# Patient Record
Sex: FEMALE | Race: WHITE
[De-identification: names, ages, dates, MRNs, and addresses within clinical notes are randomized per-mention and may not be internally consistent; named-entity substitution may affect disease eponyms.]

---

## 2019-01-09 ENCOUNTER — HOSPITAL ENCOUNTER (EMERGENCY)
Dept: HOSPITAL 11 - JP.ED | Age: 39
Discharge: HOME | End: 2019-01-09
Payer: MEDICAID

## 2019-01-09 VITALS — SYSTOLIC BLOOD PRESSURE: 112 MMHG | DIASTOLIC BLOOD PRESSURE: 72 MMHG

## 2019-01-09 DIAGNOSIS — N18.9: ICD-10-CM

## 2019-01-09 DIAGNOSIS — F17.210: ICD-10-CM

## 2019-01-09 DIAGNOSIS — T36.0X5A: ICD-10-CM

## 2019-01-09 DIAGNOSIS — Z98.51: ICD-10-CM

## 2019-01-09 DIAGNOSIS — L27.0: Primary | ICD-10-CM

## 2019-01-09 DIAGNOSIS — Z91.030: ICD-10-CM

## 2019-01-09 DIAGNOSIS — Z90.49: ICD-10-CM

## 2019-01-09 PROCEDURE — 99283 EMERGENCY DEPT VISIT LOW MDM: CPT

## 2019-01-09 PROCEDURE — 96372 THER/PROPH/DIAG INJ SC/IM: CPT

## 2019-01-09 NOTE — EDM.PDOC
ED HPI GENERAL MEDICAL PROBLEM





- General


Chief Complaint: Allergic Reaction


Stated Complaint: ALLERGIC REACTION TO MEDS


Time Seen by Provider: 19 12:20


Source of Information: Reports: Patient


History Limitations: Reports: No Limitations





- History of Present Illness


INITIAL COMMENTS - FREE TEXT/NARRATIVE: 





37 yo female here with diffuse skin redness and itching. Took a first dose of 

prescribed amoxicillin today and this reaction began shortly afterwards. Has 

had amoxicillin many times in the past, always without issue. Denies any 

difficulty breathing or swallowing and is not light-headed. 


Onset: Today


Onset Date: 19


Onset Time: 11:00


Duration: Minutes:, Constant


Location: Reports: Generalized


Quality: Reports: Other (no pain)


Severity: Moderate


Improves with: Reports: None


Worsens with: Reports: Other (? amoxicillin)


Context: Reports: Other (See HPI)


Associated Symptoms: Reports: Rash (diffuse).  Denies: Chest Pain, Fever/Chills

, Nausea/Vomiting, Shortness of Breath, Syncope


Treatments PTA: Reports: Other (see below) (none)





- Related Data


 Allergies











Allergy/AdvReac Type Severity Reaction Status Date / Time


 


amoxicillin Allergy  Rash Verified 19 12:22


 


venom-honey bee Allergy  Swelling Verified 16 01:02





[bee venom (honey bee)]     











Home Meds: 


 Home Meds





EPINEPHrine [Epipen 2-Paul] 0.3 mg IM ASDIRECTED 10/22/13 [History]


Clindamycin HCl [Cleocin HCl] 300 mg PO TID #21 capsule 19 [Rx]











Past Medical History


HEENT History: Reports: Impaired Vision


Other HEENT History: Dental caries


Respiratory History: Reports: Bronchitis, Recurrent


Gastrointestinal History: Reports: GERD


Genitourinary History: Reports: Chronic Renal Insuffiency, Renal Disease


OB/GYN History: Reports: Pregnancy


Musculoskeletal History: Reports: Arthritis, Back Pain, Chronic, Fibromyalgia


Neurological History: Reports: Concussion, Head Trauma, Migraines, Seizure


Other Neuro History: seizure activity not recent


Psychiatric History: Reports: Anxiety





- Infectious Disease History


Infectious Disease History: Reports: Chicken Pox





- Past Surgical History


HEENT Surgical History: Reports: Oral Surgery


GI Surgical History: Reports: Appendectomy, Cholecystectomy


Female  Surgical History: Reports:  Section, Tubal Ligation





Social & Family History





- Family History


Family Medical History: Noncontributory





- Tobacco Use


Smoking Status *Q: Heavy Tobacco Smoker


Years of Tobacco use: 20


Packs/Tins Daily: 1





- Recreational Drug Use


Recreational Drug Use: No





ED ROS ALLERGIC REACTION





- Review of Systems


Review Of Systems: See Below


Constitutional: Reports: No Symptoms


HEENT: Reports: No Symptoms


Respiratory: Reports: No Symptoms


Cardiovascular: Reports: No Symptoms


Endocrine: Reports: No Symptoms


GI/Abdominal: Reports: No Symptoms


: Reports: No Symptoms


Musculoskeletal: Reports: No Symptoms


Skin: Reports: Pruritis, Rash, Erythema


Neurological: Reports: No Symptoms





ED EXAM GENERAL NO PERIP PULSE





- Physical Exam


Exam: See Below


Exam Limited By: No Limitations


General Appearance: Alert, WD/WN, No Apparent Distress


Eye Exam: Bilateral Eye: Normal Inspection


Ears: Normal External Exam, Normal Canal, Hearing Grossly Normal


Nose: Normal Inspection, No Blood


Throat/Mouth: Normal Inspection, Normal Lips, Normal Oropharynx, Normal Voice, 

No Airway Compromise


Head: Atraumatic, Normocephalic


Neck: Normal Inspection


Respiratory/Chest: No Respiratory Distress, Lungs Clear, Normal Breath Sounds, 

No Accessory Muscle Use


Cardiovascular: Regular Rate, Rhythm, No Edema.  No: Tachycardia


GI/Abdominal: Normal Bowel Sounds, Soft, Non-Tender, No Distention


Back Exam: Normal Inspection.  No: CVA Tenderness (R), CVA Tenderness (L)


Extremities: Normal Inspection, Normal Range of Motion, Non-Tender, No Pedal 

Edema


Neurological: Alert, Oriented, CN II-XII Intact, Normal Cognition, No Motor/

Sensory Deficits


Psychiatric: Normal Affect, Normal Mood


Skin Exam: Warm, Dry, Intact, Erythema, Rash (fine, diffuse).  No: Increased 

Warmth, Petechiae





Course





- Vital Signs


Last Recorded V/S: 


 Last Vital Signs











Temp  36.6 C   19 12:20


 


Pulse  113 H  19 12:20


 


Resp  17   19 12:20


 


BP  110/78   19 12:20


 


Pulse Ox  94 L  19 12:20














- Orders/Labs/Meds


Meds: 


Medications














Discontinued Medications














Generic Name Dose Route Start Last Admin





  Trade Name Lexi  PRN Reason Stop Dose Admin


 


Hydroxyzine HCl  75 mg  19 12:26  19 12:31





  Vistaril  IM  19 12:27  75 mg





  ONETIME ONE   Administration





     





     





     





     














Departure





- Departure


Time of Disposition: 13:19


Disposition: Home, Self-Care 01


Condition: Good


Clinical Impression: 


Allergic reaction to drug


Qualifiers:


 Encounter type: initial encounter Qualified Code(s): T78.40XA - Allergy, 

unspecified, initial encounter








- Discharge Information


*PRESCRIPTION DRUG MONITORING PROGRAM REVIEWED*: No


*COPY OF PRESCRIPTION DRUG MONITORING REPORT IN PATIENT ARACELI: No


Prescriptions: 


Clindamycin HCl [Cleocin HCl] 300 mg PO TID #21 capsule


Instructions:  Allergies, Adult, Easy-to-Read


Referrals: 


PCP,None [Primary Care Provider] - 


Forms:  ED Department Discharge


Additional Instructions: 


Stop amoxicillin and don't take any medicine from the 'cillin family in the 

future. Take clindamycin as directed. F/U with your dentist as previously 

scheduled. Starting after 5 pm today if you have any residual itching take 

diphenhydramine 50 mg every 4-6 hrs as needed. Return if a lot worse.

## 2021-03-05 NOTE — CR
Tibia Fibula Rt

 

CLINICAL HISTORY: Pain below right knee, fall

 

FINDINGS: Two views show no evidence of fracture or bone destruction. No soft

tissue abnormality is seen.

 

Impression: Negative
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
## 2021-07-16 NOTE — EDM.PDOC
ED HPI GENERAL MEDICAL PROBLEM





- General


Chief Complaint: Allergic Reaction


Stated Complaint: PRESCRIBED SAVELLA BY DIONISIO XENIA EXPER SIDE EFFECT


Time Seen by Provider: 21 11:40


Source of Information: Reports: Patient


History Limitations: Reports: No Limitations





- History of Present Illness


INITIAL COMMENTS - FREE TEXT/NARRATIVE: 


This is a 40 year old female presenting with dizziness, shakiness, nausea, and 

sweating. She reports that around 9 am this morning she took her first dose of 

Savella for her fibromyalgia. Shortly after, she started experiencing dizziness,

feeling shaking, nausea and dry heaving, and sweating. She was otherwise in her 

usual state of health prior to taking this medication for the first time. She 

denies any chest pain or SOB. She denies abdominal pain, diarrhea, or urinary 

symptoms.





- Related Data


                                    Allergies











Allergy/AdvReac Type Severity Reaction Status Date / Time


 


amoxicillin Allergy  Rash Verified 21 11:36


 


venom-honey bee Allergy  Swelling Verified 21 11:36





[bee venom (honey bee)]     











Home Meds: 


                                    Home Meds





EPINEPHrine [Epipen 2-Paul] 0.3 mg IM ASDIRECTED 10/22/13 [History]


Milnacipran HCl [Savella] 1 tab PO DAILY 21 [History]











Past Medical History


HEENT History: Reports: Impaired Vision


Other HEENT History: Dental caries


Respiratory History: Reports: Bronchitis, Recurrent


Gastrointestinal History: Reports: GERD


Genitourinary History: Reports: Chronic Renal Insuffiency, Renal Disease


OB/GYN History: Reports: Pregnancy


Musculoskeletal History: Reports: Arthritis, Back Pain, Chronic, Fracture, 

Fibromyalgia


Other Musculoskeletal History: right tib FX 3/5/21


Neurological History: Reports: Concussion, Head Trauma, Migraines, Seizure


Other Neuro History: seizure activity not recent


Psychiatric History: Reports: Anxiety





- Infectious Disease History


Infectious Disease History: Reports: Chicken Pox





- Past Surgical History


Head Surgeries/Procedures: Reports: None


HEENT Surgical History: Reports: Oral Surgery


Other HEENT Surgeries/Procedures: Dental extractions


Respiratory Surgical History: Reports: None


GI Surgical History: Reports: Appendectomy, Cholecystectomy, EGD


Female  Surgical History: Reports:  Section, Tubal Ligation


Neurological Surgical History: Reports: None


Musculoskeletal Surgical History: Reports: None


Dermatological Surgical History: Reports: None





Social & Family History





- Family History


Family Medical History: No Pertinent Family History





- Tobacco Use


Tobacco Use Status *Q: Current Every Day Tobacco User


Years of Tobacco use: 25


Packs/Tins Daily: 1





- Caffeine Use


Caffeine Use: Reports: Coffee, Soda





ED ROS ALLERGIC REACTION





- Review of Systems


Review Of Systems: Comprehensive ROS is negative, except as noted in HPI.





ED EXAM GENERAL NO PERIP PULSE





- Physical Exam


Exam: See Below


Exam Limited By: No Limitations


General Appearance: Alert, No Apparent Distress


Throat/Mouth: Other (MMM)


Head: Atraumatic, Normocephalic


Neck: Full Range of Motion


Respiratory/Chest: No Respiratory Distress, Lungs Clear, Normal Breath Sounds


Cardiovascular: Regular Rate, Rhythm


GI/Abdominal: Soft, Non-Tender


Extremities: Normal Range of Motion, No Pedal Edema


Neurological: Alert, CN II-XII Intact, No Motor/Sensory Deficits


Psychiatric: Normal Affect, Normal Mood


Skin Exam: Warm, Dry


  ** #1 Interpretation


EKG Date: 21


Time: 12:27


Rhythm: NSR


Rate (Beats/Min): 76


P-Wave: Present


QRS: Normal


ST-T: Normal


QT: Normal


Comparison: NA - No Prior EKG


EKG Interpretation Comments: 


normal sinus rhythm without evidence of arrhythmia or ischemia





Course





- Vital Signs


Last Recorded V/S: 


                                Last Vital Signs











Temp  97.8 F   21 11:43


 


Pulse  72   21 12:40


 


Resp  15   21 11:43


 


BP  114/74   21 12:40


 


Pulse Ox  95   21 12:40














- Orders/Labs/Meds


Orders: 


                               Active Orders 24 hr











 Category Date Time Status


 


 EKG 12 Lead [EK] Stat Ther  21 11:51 Ordered











Labs: 


                                Laboratory Tests











  21 Range/Units





  12:01 12:01 


 


WBC  11.0   (4.5-11.0)  K/uL


 


RBC  4.89   (3.30-5.50)  M/uL


 


Hgb  14.9   (12.0-15.0)  g/dL


 


Hct  43.4   (36.0-48.0)  %


 


MCV  89   (80-98)  fL


 


MCH  31   (27-31)  pg


 


MCHC  34   (32-36)  %


 


Plt Count  351   (150-400)  K/uL


 


Neut % (Auto)  81.7 H   (36-66)  %


 


Lymph % (Auto)  13.0 L   (24-44)  %


 


Mono % (Auto)  4.5   (2-6)  %


 


Eos % (Auto)  0.6 L   (2-4)  %


 


Baso % (Auto)  0.2   (0-1)  %


 


Sodium   139 L  (140-148)  mmol/L


 


Potassium   4.2  (3.6-5.2)  mmol/L


 


Chloride   103  (100-108)  mmol/L


 


Carbon Dioxide   26  (21-32)  mmol/L


 


Anion Gap   14.2 H  (5.0-14.0)  mmol/L


 


BUN   6 L  (7-18)  mg/dL


 


Creatinine   0.9  (0.6-1.0)  mg/dL


 


Est Cr Clr Drug Dosing   81.61  mL/min


 


Estimated GFR (MDRD)   > 60  (>60)  


 


Glucose   98  ()  mg/dL


 


Calcium   8.7  (8.5-10.1)  mg/dL











Meds: 


Medications














Discontinued Medications














Generic Name Dose Route Start Last Admin





  Trade Name Freq  PRN Reason Stop Dose Admin


 


Sodium Chloride  1,000 mls @ 1,000 mls/hr  21 12:00  21 12:53





  Normal Saline  IV   1,000 mls/hr





  ASDIRECTED SUNNY   Administration


 


Ondansetron HCl  4 mg  21 11:53  21 12:52





  Ondansetron 4 Mg/2 Ml Sdv  IVPUSH  21 11:54  4 mg





  ONETIME ONE   Administration














Departure





- Departure


Time of Disposition: 13:43


Disposition: Home, Self-Care 01


Clinical Impression: 


Medication adverse effect


Qualifiers:


 Encounter type: initial encounter Qualified Code(s): T50.905A - Adverse effect 

of unspecified drugs, medicaments and biological substances, initial encounter








- Discharge Information


Instructions:  Basics of Medicine Management


Referrals: 


[facility] [Other]


Forms:  ED Department Discharge


Additional Instructions: 


Discontinue the Savella. Please follow up with your doctor next week to discuss 

alternative medications to consider. You may continue to experience some adverse

 symptoms to the medication over the next 1-2 days while the medication is 

metabolized by your body. Return to the Emergency Department for any new or 

worsening symptoms, including persistent vomiting, passing out, difficulty 

breathing, or other concerning symptoms. 





Sepsis Event Note (ED)





- Evaluation


Sepsis Screening Result: No Definite Risk





- Focused Exam


Vital Signs: 


                                   Vital Signs











  Temp Pulse Resp BP Pulse Ox


 


 21 12:40   72   114/74  95


 


 21 12:12   64   95/62  96


 


 21 11:43  97.8 F  91  15  124/75  98


 


 21 11:40   94   122/78  96


 


 21 11:14  97.8 F  91  15  124/75  98














- Problem List Review


Problem List Initiated/Reviewed/Updated: Yes





- My Orders


Last 24 Hours: 


My Active Orders





21 11:51


EKG 12 Lead [EK] Stat 














- Assessment/Plan


Last 24 Hours: 


My Active Orders





21 11:51


EKG 12 Lead [EK] Stat 











Assessment:: 


This is a 40 year old female presenting with dizziness/lightheadedness, feeling 

shaky, and nauseated after starting Savella for her fibromyalgia. She is 

afebrile and well appearing on arrival. Her evaluation today has been 

unrevealing. she is feeling somewhat improved after zofran and IVF. I suspect 

that her symptoms are likely side effects of her new medication. I recommended 

she discontinue this (she has only had one dose so no need to taper) and follow 

up with her primary care provider to discuss alternative treatment options for 

her fibromyalgia. Instructed patient to return to the ED for any new or 

worsening symptoms, including persistent vomiting, chest pain, SOB, or other 

concerning symptoms.